# Patient Record
Sex: MALE | Race: BLACK OR AFRICAN AMERICAN | NOT HISPANIC OR LATINO | Employment: OTHER | URBAN - METROPOLITAN AREA
[De-identification: names, ages, dates, MRNs, and addresses within clinical notes are randomized per-mention and may not be internally consistent; named-entity substitution may affect disease eponyms.]

---

## 2020-12-30 ENCOUNTER — HOSPITAL ENCOUNTER (EMERGENCY)
Facility: HOSPITAL | Age: 35
Discharge: HOME | End: 2020-12-30
Attending: EMERGENCY MEDICINE

## 2020-12-30 VITALS
OXYGEN SATURATION: 98 % | DIASTOLIC BLOOD PRESSURE: 94 MMHG | HEART RATE: 66 BPM | SYSTOLIC BLOOD PRESSURE: 167 MMHG | TEMPERATURE: 99 F | WEIGHT: 154.13 LBS | BODY MASS INDEX: 23.36 KG/M2 | HEIGHT: 68 IN | RESPIRATION RATE: 16 BRPM

## 2020-12-30 DIAGNOSIS — K08.89 PAIN, DENTAL: Primary | ICD-10-CM

## 2020-12-30 PROCEDURE — 99284 EMERGENCY DEPT VISIT MOD MDM: CPT

## 2020-12-30 RX ORDER — CLINDAMYCIN HYDROCHLORIDE 150 MG/1
300 CAPSULE ORAL 4 TIMES DAILY
Qty: 56 CAPSULE | Refills: 0 | Status: SHIPPED | OUTPATIENT
Start: 2020-12-30 | End: 2021-01-06

## 2020-12-30 RX ORDER — OXYCODONE AND ACETAMINOPHEN 5; 325 MG/1; MG/1
1 TABLET ORAL EVERY 4 HOURS PRN
Qty: 18 TABLET | Refills: 0 | Status: SHIPPED | OUTPATIENT
Start: 2020-12-30

## 2023-03-09 ENCOUNTER — HOSPITAL ENCOUNTER (EMERGENCY)
Facility: HOSPITAL | Age: 38
Discharge: HOME | End: 2023-03-09
Attending: EMERGENCY MEDICINE

## 2023-03-09 VITALS
TEMPERATURE: 98 F | SYSTOLIC BLOOD PRESSURE: 162 MMHG | DIASTOLIC BLOOD PRESSURE: 98 MMHG | BODY MASS INDEX: 25.12 KG/M2 | RESPIRATION RATE: 20 BRPM | WEIGHT: 165.25 LBS | OXYGEN SATURATION: 97 % | HEART RATE: 63 BPM

## 2023-03-09 DIAGNOSIS — K08.89 PAIN, DENTAL: ICD-10-CM

## 2023-03-09 DIAGNOSIS — K04.7 DENTAL ABSCESS: Primary | ICD-10-CM

## 2023-03-09 PROCEDURE — 99284 EMERGENCY DEPT VISIT MOD MDM: CPT

## 2023-03-09 PROCEDURE — 25000003 PHARM REV CODE 250: Performed by: REGISTERED NURSE

## 2023-03-09 RX ORDER — CLINDAMYCIN HYDROCHLORIDE 300 MG/1
300 CAPSULE ORAL EVERY 6 HOURS
Qty: 28 CAPSULE | Refills: 0 | Status: SHIPPED | OUTPATIENT
Start: 2023-03-09 | End: 2023-03-16

## 2023-03-09 RX ORDER — IBUPROFEN 800 MG/1
800 TABLET ORAL EVERY 6 HOURS PRN
Qty: 20 TABLET | Refills: 0 | Status: SHIPPED | OUTPATIENT
Start: 2023-03-09

## 2023-03-09 RX ORDER — CLINDAMYCIN HYDROCHLORIDE 150 MG/1
600 CAPSULE ORAL
Status: COMPLETED | OUTPATIENT
Start: 2023-03-09 | End: 2023-03-09

## 2023-03-09 RX ORDER — HYDROCODONE BITARTRATE AND ACETAMINOPHEN 5; 325 MG/1; MG/1
1 TABLET ORAL EVERY 6 HOURS PRN
Qty: 12 TABLET | Refills: 0 | Status: SHIPPED | OUTPATIENT
Start: 2023-03-09

## 2023-03-09 RX ORDER — HYDROCODONE BITARTRATE AND ACETAMINOPHEN 10; 325 MG/1; MG/1
1 TABLET ORAL
Status: COMPLETED | OUTPATIENT
Start: 2023-03-09 | End: 2023-03-09

## 2023-03-09 RX ADMIN — CLINDAMYCIN HYDROCHLORIDE 600 MG: 150 CAPSULE ORAL at 02:03

## 2023-03-09 RX ADMIN — HYDROCODONE BITARTRATE AND ACETAMINOPHEN 1 TABLET: 10; 325 TABLET ORAL at 02:03

## 2023-03-09 NOTE — ED PROVIDER NOTES
Encounter Date: 3/9/2023       History     Chief Complaint   Patient presents with    Dental Pain     Pt. C/o having upper and lower dental pain with abscesses on the left side of his mouth. Pt states he has been taking IBU but it has not really helped       38-year-old male presents emergency department with complaints of dental pain and swelling.  Patient reports the symptoms began yesterday.  Patient denies any trismus, fever, chills, headache, nausea/vomiting or any other symptoms at this time.    The history is provided by the patient.   Review of patient's allergies indicates:   Allergen Reactions    Penicillins Hives     No past medical history on file.  Past Surgical History:   Procedure Laterality Date    FRACTURE SURGERY  2004    jaw     No family history on file.  Social History     Tobacco Use    Smoking status: Light Smoker     Types: Vaping w/o nicotine   Substance Use Topics    Alcohol use: Not Currently    Drug use: Not Currently     Review of Systems   Constitutional:  Negative for fever.   HENT:  Positive for dental problem. Negative for sore throat.    Respiratory:  Negative for shortness of breath.    Cardiovascular:  Negative for chest pain.   Gastrointestinal:  Negative for nausea.   Genitourinary:  Negative for dysuria.   Musculoskeletal:  Negative for back pain.   Skin:  Negative for rash.   Neurological:  Negative for weakness.   Hematological:  Does not bruise/bleed easily.   All other systems reviewed and are negative.    Physical Exam     Initial Vitals [03/09/23 1250]   BP Pulse Resp Temp SpO2   (!) 162/98 63 16 97.8 °F (36.6 °C) 97 %      MAP       --         Physical Exam    Constitutional: He appears well-developed and well-nourished. No distress.   HENT:   Head: Normocephalic and atraumatic.   Nose: Nose normal.   Mouth/Throat: Uvula is midline and oropharynx is clear and moist. No trismus in the jaw. Dental abscesses and dental caries present. No uvula swelling.   Left maxillary  swelling, no facial cellulitis, multiple dental caries, no trismus   Eyes: Conjunctivae and EOM are normal. Pupils are equal, round, and reactive to light.   Neck: Neck supple.   Normal range of motion.  Cardiovascular:  Normal rate and regular rhythm.           Pulmonary/Chest: Effort normal and breath sounds normal. No respiratory distress. He has no decreased breath sounds. He has no wheezes. He has no rales.   Abdominal: Abdomen is soft. Bowel sounds are normal. There is no abdominal tenderness.   Musculoskeletal:         General: Normal range of motion.      Cervical back: Normal range of motion and neck supple.     Neurological: He is alert and oriented to person, place, and time. He has normal strength. GCS eye subscore is 4. GCS verbal subscore is 5. GCS motor subscore is 6.   Skin: Skin is warm and dry. Capillary refill takes less than 2 seconds. No rash noted.   Psychiatric: He has a normal mood and affect. His speech is normal and behavior is normal.       ED Course   Procedures  Labs Reviewed - No data to display       Imaging Results    None          Medications   clindamycin capsule 600 mg (600 mg Oral Given 3/9/23 1420)   HYDROcodone-acetaminophen  mg per tablet 1 tablet (1 tablet Oral Given 3/9/23 1420)                              Clinical Impression:   Final diagnoses:  [K08.89] Pain, dental  [K04.7] Dental abscess (Primary)        ED Disposition Condition    Discharge Stable          ED Prescriptions       Medication Sig Dispense Start Date End Date Auth. Provider    clindamycin (CLEOCIN) 300 MG capsule Take 1 capsule (300 mg total) by mouth every 6 (six) hours. for 7 days 28 capsule 3/9/2023 3/16/2023 Ilya Thomas Jr., MALI    HYDROcodone-acetaminophen (NORCO) 5-325 mg per tablet Take 1 tablet by mouth every 6 (six) hours as needed for Pain. 12 tablet 3/9/2023 -- MALI Dowling Jr.    ibuprofen (ADVIL,MOTRIN) 800 MG tablet Take 1 tablet (800 mg total) by mouth every 6 (six)  hours as needed for Pain. 20 tablet 3/9/2023 -- Ilya Thomas Jr., FNP          Follow-up Information       Follow up With Specialties Details Why Contact Info    [facility] - Emergency Dept. Emergency Medicine  If symptoms worsen [id number] [facility] [id number] [phone]             Ilya Thomas Jr., BRUCEP  03/09/23 3397